# Patient Record
Sex: MALE | Race: OTHER | HISPANIC OR LATINO | Employment: FULL TIME | URBAN - METROPOLITAN AREA
[De-identification: names, ages, dates, MRNs, and addresses within clinical notes are randomized per-mention and may not be internally consistent; named-entity substitution may affect disease eponyms.]

---

## 2023-10-27 ENCOUNTER — HOSPITAL ENCOUNTER (EMERGENCY)
Facility: HOSPITAL | Age: 38
Discharge: HOME | End: 2023-10-27
Attending: STUDENT IN AN ORGANIZED HEALTH CARE EDUCATION/TRAINING PROGRAM

## 2023-10-27 VITALS
OXYGEN SATURATION: 97 % | HEART RATE: 95 BPM | RESPIRATION RATE: 20 BRPM | WEIGHT: 209.44 LBS | SYSTOLIC BLOOD PRESSURE: 142 MMHG | DIASTOLIC BLOOD PRESSURE: 93 MMHG | TEMPERATURE: 98 F | HEIGHT: 70 IN | BODY MASS INDEX: 29.98 KG/M2

## 2023-10-27 DIAGNOSIS — S05.01XA ABRASION OF RIGHT CORNEA, INITIAL ENCOUNTER: Primary | ICD-10-CM

## 2023-10-27 PROCEDURE — 25000003 PHARM REV CODE 250

## 2023-10-27 PROCEDURE — 99283 EMERGENCY DEPT VISIT LOW MDM: CPT

## 2023-10-27 RX ORDER — HYDROCODONE BITARTRATE AND ACETAMINOPHEN 5; 325 MG/1; MG/1
1 TABLET ORAL
Status: COMPLETED | OUTPATIENT
Start: 2023-10-27 | End: 2023-10-27

## 2023-10-27 RX ORDER — OFLOXACIN 3 MG/ML
1 SOLUTION/ DROPS OPHTHALMIC 4 TIMES DAILY
Qty: 5 ML | Refills: 0 | Status: SHIPPED | OUTPATIENT
Start: 2023-10-27

## 2023-10-27 RX ORDER — TRAMADOL HYDROCHLORIDE 50 MG/1
50 TABLET ORAL EVERY 6 HOURS PRN
Qty: 6 TABLET | Refills: 0 | Status: SHIPPED | OUTPATIENT
Start: 2023-10-27

## 2023-10-27 RX ORDER — TETRACAINE HYDROCHLORIDE 5 MG/ML
2 SOLUTION OPHTHALMIC
Status: COMPLETED | OUTPATIENT
Start: 2023-10-27 | End: 2023-10-27

## 2023-10-27 RX ADMIN — HYDROCODONE BITARTRATE AND ACETAMINOPHEN 1 TABLET: 5; 325 TABLET ORAL at 08:10

## 2023-10-27 RX ADMIN — TETRACAINE HYDROCHLORIDE 2 DROP: 5 SOLUTION OPHTHALMIC at 08:10

## 2023-10-27 RX ADMIN — FLUORESCEIN SODIUM 1 EACH: 1 STRIP OPHTHALMIC at 08:10

## 2023-10-28 NOTE — DISCHARGE INSTRUCTIONS
Patient will be discharged home.  Apply antibiotic eyedrops to the right eye 4 times daily.  Avoid rubbing the eye.  Follow up with eye doctor for recheck next week.  Return ER for any worsening symptoms

## 2023-10-28 NOTE — ED PROVIDER NOTES
Encounter Date: 10/27/2023       History     Chief Complaint   Patient presents with    Eye Problem     Pt c/o irritation and redness to his left eye that started this morning while he was at work.      Patient reports working last night and believes he got something in his right eye causing pain and tenderness    The history is provided by the patient and a friend. The history is limited by a language barrier. A  was used.     Review of patient's allergies indicates:  No Known Allergies  No past medical history on file.  No past surgical history on file.  No family history on file.     Review of Systems   Constitutional: Negative.    HENT: Negative.     Eyes:  Positive for pain, discharge and redness.   Respiratory: Negative.     Cardiovascular: Negative.    Gastrointestinal: Negative.    Endocrine: Negative.    Genitourinary: Negative.    Musculoskeletal: Negative.    Skin: Negative.    Allergic/Immunologic: Negative.    Neurological: Negative.    Hematological: Negative.    Psychiatric/Behavioral: Negative.     All other systems reviewed and are negative.      Physical Exam     Initial Vitals [10/27/23 1959]   BP Pulse Resp Temp SpO2   (!) 142/93 95 18 98 °F (36.7 °C) 97 %      MAP       --         Physical Exam    Nursing note and vitals reviewed.  Constitutional: He appears well-developed and well-nourished.   HENT:   Head: Normocephalic and atraumatic.   Right Ear: External ear normal.   Left Ear: External ear normal.   Nose: Nose normal.   Mouth/Throat: Oropharynx is clear and moist.   Eyes: Conjunctivae and EOM are normal. Pupils are equal, round, and reactive to light.   Neck: Neck supple.   Normal range of motion.  Cardiovascular:  Normal rate, regular rhythm, normal heart sounds and intact distal pulses.           Pulmonary/Chest: Breath sounds normal.   Abdominal: Abdomen is soft. Bowel sounds are normal.   Musculoskeletal:         General: Normal range of motion.      Cervical back:  Normal range of motion and neck supple.     Neurological: He is alert and oriented to person, place, and time. He has normal strength and normal reflexes. GCS score is 15. GCS eye subscore is 4. GCS verbal subscore is 5. GCS motor subscore is 6.   Skin: Skin is warm and dry. Capillary refill takes less than 2 seconds.   Psychiatric: He has a normal mood and affect. His behavior is normal. Judgment and thought content normal.         ED Course   Procedures  Labs Reviewed - No data to display       Imaging Results    None          Medications   fluorescein ophthalmic strip 1 each (1 each Right Eye Given 10/27/23 2006)   TETRAcaine HCl (PF) 0.5 % Drop 2 drop (2 drops Right Eye Given by Provider 10/27/23 2015)   HYDROcodone-acetaminophen 5-325 mg per tablet 1 tablet (1 tablet Oral Given 10/27/23 2017)     Medical Decision Making  Awake alert and oriented 38-year-old male presents emergency room with complaints of right eye pain and redness onset last night.  Head atraumatic. 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  Right eye without any periorbital soft tissue swelling and warmth.  No limitation of PERRLA, EOMI of right eye intact without limitation or complaint of pain.  Lids and lashes clear.  Sclera red conjunctiva normal.  Mild tearing no drainage.  No photophobia or chemosis.  Normal eye exam no hemorrhage noted.  No per ptosis nystagmus.  No limitation of upward gaze corneal sensation normal.  No obvious foreign body or globe disruption.  Corneas or worsen clear with no obvious hyphema.  Wood's lamp exam completed topical anesthetic was still with good anesthesia.  One drop of ophthalmic steady gait and applied.  Fluorescein stain of the right eye was performed no foreign body observed ulcer.  Upper lid was everted and no foreign body noted.  Small corneal abrasion noted to the 11 o'clock position.  Patient tolerated procedure well without any complications.  All other review of systems within normal limits.      Risk  Prescription drug management.                               Clinical Impression:   Final diagnoses:  [S05.01XA] Abrasion of right cornea, initial encounter (Primary)        ED Disposition Condition    Discharge Stable          ED Prescriptions       Medication Sig Dispense Start Date End Date Auth. Provider    ofloxacin (OCUFLOX) 0.3 % ophthalmic solution Place 1 drop into the right eye 4 (four) times daily. 5 mL 10/27/2023 -- Abigail Gupta NP    traMADoL (ULTRAM) 50 mg tablet Take 1 tablet (50 mg total) by mouth every 6 (six) hours as needed for Pain. 6 tablet 10/27/2023 -- Abigail Gupta NP          Follow-up Information    None          Abigail Gupta NP  10/27/23 2032